# Patient Record
Sex: FEMALE | Race: WHITE | URBAN - METROPOLITAN AREA
[De-identification: names, ages, dates, MRNs, and addresses within clinical notes are randomized per-mention and may not be internally consistent; named-entity substitution may affect disease eponyms.]

---

## 2017-12-21 ENCOUNTER — OFFICE VISIT (OUTPATIENT)
Dept: FAMILY MEDICINE CLINIC | Age: 57
End: 2017-12-21

## 2017-12-21 VITALS
TEMPERATURE: 96.8 F | OXYGEN SATURATION: 96 % | WEIGHT: 153.38 LBS | HEIGHT: 66 IN | RESPIRATION RATE: 12 BRPM | DIASTOLIC BLOOD PRESSURE: 88 MMHG | SYSTOLIC BLOOD PRESSURE: 138 MMHG | BODY MASS INDEX: 24.65 KG/M2 | HEART RATE: 88 BPM

## 2017-12-21 DIAGNOSIS — Z12.31 ENCOUNTER FOR SCREENING MAMMOGRAM FOR BREAST CANCER: Primary | ICD-10-CM

## 2017-12-21 DIAGNOSIS — J01.01 ACUTE RECURRENT MAXILLARY SINUSITIS: Primary | ICD-10-CM

## 2017-12-21 PROCEDURE — 1036F TOBACCO NON-USER: CPT | Performed by: FAMILY MEDICINE

## 2017-12-21 PROCEDURE — G8427 DOCREV CUR MEDS BY ELIG CLIN: HCPCS | Performed by: FAMILY MEDICINE

## 2017-12-21 PROCEDURE — 99213 OFFICE O/P EST LOW 20 MIN: CPT | Performed by: FAMILY MEDICINE

## 2017-12-21 PROCEDURE — 3017F COLORECTAL CA SCREEN DOC REV: CPT | Performed by: FAMILY MEDICINE

## 2017-12-21 PROCEDURE — G8420 CALC BMI NORM PARAMETERS: HCPCS | Performed by: FAMILY MEDICINE

## 2017-12-21 PROCEDURE — G8484 FLU IMMUNIZE NO ADMIN: HCPCS | Performed by: FAMILY MEDICINE

## 2017-12-21 PROCEDURE — 3014F SCREEN MAMMO DOC REV: CPT | Performed by: FAMILY MEDICINE

## 2017-12-21 RX ORDER — CEFDINIR 300 MG/1
600 CAPSULE ORAL DAILY
Qty: 20 CAPSULE | Refills: 0 | Status: SHIPPED | OUTPATIENT
Start: 2017-12-21 | End: 2017-12-31

## 2017-12-21 NOTE — PROGRESS NOTES
Narrative    No narrative on file     Family History   Problem Relation Age of Onset    High Blood Pressure Mother     Diabetes Mother     Heart Disease Father      Past Medical History:   Diagnosis Date    Grave's disease     Hypertension     Pernicious anemia      History reviewed. No pertinent surgical history. REVIEW OF SYSTEMS:   REVIEW OF SYSTEMS:   Patient seen today for exam.  Denies any problems with hearing, headaches or vision. Denies any shortness of breath, chest pain, nausea or vomiting. No black stool, no blood in the stool. No heartburn. Denies any problems with constipation or diarrhea either. No dysuria type symptoms. Objective:     /88 (Site: Left Arm, Position: Sitting, Cuff Size: Medium Adult)   Pulse 88   Temp 96.8 °F (36 °C) (Temporal)   Resp 12   Ht 5' 6\" (1.676 m)   Wt 153 lb 6 oz (69.6 kg)   SpO2 96%   BMI 24.76 kg/m²     Physical Exam      PHYSICAL EXAMINATION:  Vital signs as noted. Alert, oriented in no acute distress. HEENT:  TMs are showing fluid bilaterally. Pharynx reveals postnasal drainage noted. Positive pain over sinuses and forehead  Pos shoddy adenopathy noted bilaterallyNECK: supple. HEART:  RRR without gallops. LUNGS:  clear to auscultation, no wheezing or rhonchi. ABDOMEN:  soft and nontender, bowel sounds positive. EXTREMITIES:  no edema. SKIN: without any changes      Assessment:      1. Acute recurrent maxillary sinusitis               Plan:        Orders Placed This Encounter   Medications    cefdinir (OMNICEF) 300 MG capsule     Sig: Take 2 capsules by mouth daily for 10 days     Dispense:  20 capsule     Refill:  0     No orders of the defined types were placed in this encounter.           Health Maintenance Due   Topic Date Due    Hepatitis C screen  1960    HIV screen  04/04/1973    DTaP/Tdap/Td vaccine (1 - Tdap) 04/04/1977    Cervical cancer screen  04/04/1979    Lipid screen  04/04/1998    Colon cancer screen colonoscopy  04/04/2008    Flu vaccine (1) 09/01/2017             Controlled Substances Monitoring:      she will see us over this next month for her physical exam fasting        If anything worsens or changes please call us at once , follow-up in the office as planned,

## 2024-01-24 ENCOUNTER — ANCILLARY PROCEDURE (OUTPATIENT)
Dept: RADIOLOGY | Facility: CLINIC | Age: 64
End: 2024-01-24
Payer: COMMERCIAL

## 2024-01-24 DIAGNOSIS — Z82.49 FAMILY HISTORY OF ISCHEMIC HEART DISEASE AND OTHER DISEASES OF THE CIRCULATORY SYSTEM: ICD-10-CM

## 2024-01-24 DIAGNOSIS — I11.9 HYPERTENSIVE HEART DISEASE WITHOUT HEART FAILURE: ICD-10-CM

## 2024-01-24 PROCEDURE — 75571 CT HRT W/O DYE W/CA TEST: CPT

## 2025-06-03 ENCOUNTER — APPOINTMENT (OUTPATIENT)
Dept: PRIMARY CARE | Facility: CLINIC | Age: 65
End: 2025-06-03
Payer: COMMERCIAL

## 2025-06-03 VITALS
WEIGHT: 165 LBS | BODY MASS INDEX: 27.49 KG/M2 | DIASTOLIC BLOOD PRESSURE: 92 MMHG | HEART RATE: 84 BPM | SYSTOLIC BLOOD PRESSURE: 171 MMHG | HEIGHT: 65 IN

## 2025-06-03 DIAGNOSIS — I87.303 CHRONIC VENOUS HYPERTENSION INVOLVING BOTH SIDES: Primary | ICD-10-CM

## 2025-06-03 DIAGNOSIS — I83.893 VARICOSE VEINS OF BILATERAL LOWER EXTREMITIES WITH OTHER COMPLICATIONS: ICD-10-CM

## 2025-06-03 PROCEDURE — 1123F ACP DISCUSS/DSCN MKR DOCD: CPT | Performed by: INTERNAL MEDICINE

## 2025-06-03 PROCEDURE — 1036F TOBACCO NON-USER: CPT | Performed by: INTERNAL MEDICINE

## 2025-06-03 PROCEDURE — 1160F RVW MEDS BY RX/DR IN RCRD: CPT | Performed by: INTERNAL MEDICINE

## 2025-06-03 PROCEDURE — 1158F ADVNC CARE PLAN TLK DOCD: CPT | Performed by: INTERNAL MEDICINE

## 2025-06-03 PROCEDURE — 1159F MED LIST DOCD IN RCRD: CPT | Performed by: INTERNAL MEDICINE

## 2025-06-03 PROCEDURE — 99204 OFFICE O/P NEW MOD 45 MIN: CPT | Performed by: INTERNAL MEDICINE

## 2025-06-03 RX ORDER — LEVOTHYROXINE SODIUM 75 UG/1
75 TABLET ORAL DAILY
COMMUNITY
Start: 2025-03-19 | End: 2025-06-17

## 2025-06-03 RX ORDER — PRAVASTATIN SODIUM 20 MG/1
20 TABLET ORAL DAILY
COMMUNITY

## 2025-06-03 RX ORDER — OMEPRAZOLE 40 MG/1
1 CAPSULE, DELAYED RELEASE ORAL DAILY
COMMUNITY

## 2025-06-03 RX ORDER — MELATONIN 10 MG
1 TABLET, SUBLINGUAL SUBLINGUAL DAILY
COMMUNITY

## 2025-06-03 RX ORDER — CARVEDILOL 6.25 MG/1
6.25 TABLET ORAL 2 TIMES DAILY
COMMUNITY

## 2025-06-03 RX ORDER — CYANOCOBALAMIN 1000 UG/ML
1000 INJECTION, SOLUTION INTRAMUSCULAR; SUBCUTANEOUS
COMMUNITY

## 2025-06-03 RX ORDER — HYDRALAZINE HYDROCHLORIDE 10 MG/1
10 TABLET, FILM COATED ORAL DAILY
COMMUNITY

## 2025-06-03 RX ORDER — ASPIRIN 81 MG/1
81 TABLET ORAL
COMMUNITY

## 2025-06-03 RX ORDER — AMLODIPINE BESYLATE 5 MG/1
5 TABLET ORAL DAILY
COMMUNITY

## 2025-06-03 NOTE — PROGRESS NOTES
Chief Complaints:  New consult for leg symptoms referred by self      HPI:    History of Present Illness  Liz Anand is a 65 year old female who presents with leg pain and swelling.    She experiences tightness and aching in her legs, particularly at the end of the day or after prolonged standing. Her legs swell slightly, but the primary issue is the aching sensation. The symptoms persist even when wearing compression stockings, which she uses more frequently in the winter due to discomfort and heat in the summer. She describes the sensation of the stockings as feeling like a 'tourniquet' which exacerbates knee pain. Despite wearing compression stockings, her legs remain swollen by the end of the day. She is unsure of the compression level of her current stockings.    She maintains an active lifestyle, caring for her grandchildren and engaging in activities such as walking on a treadmill for 20 minutes daily and using an exercise bike for 10 minutes. She also participates in camping and hiking with her family. 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    She denies any history of smoking and monitors her blood pressure at home, noting that it is typically well-controlled outside of medical settings. She has a family history of venous issues, with her  having experienced deep vein thrombosis and other family members having similar vein problems.         Bilateral Leg Symptoms:  Current symptoms include Leg pain, swelling, cramps and intermittent achiness  The severity is mild to moderate moderate.  Aggravating factors include prolonged sitting/standing, walking.  Alleviating factors include leg elevation.  Activities of Daily Living The patient's symptoms are worse later in the day,   The patient reports that the symptoms interfere with day to day activities.    Pertinent medical history:  no deep venous thrombosis,  "no hypercoagulable state, no pulmonary embolism and no thrombophlebitis. Risk factors:  family history of varicose veins.     ROS:  Respiratory:  no shortness of breath.  no cough, sinus congestion    Cardiovascular:    no Chest pain.  no Claudication.  no Cyanosis.  Palpitations, denies.    Neurologic:    no Tingling/Numbness.  no Loss of strength.    Social History:  no Tobacco Use:    Medications Ordered Prior to Encounter[1]     RX Allergies[2]     Examination:    Visit Vitals  BP (!) 171/92 (BP Location: Left arm, Patient Position: Sitting, BP Cuff Size: Adult)   Pulse 84   Ht 1.651 m (5' 5\")   Wt 74.8 kg (165 lb)   BMI 27.46 kg/m²   Smoking Status Never   BSA 1.85 m²        Normal-built, well-nourished  with no apparent distress. Alert oriented  Skin:  Normal turgor.  No rash.  Head:  Normocephalic, atraumatic.  Eyes:  Pupils are equal, round,.  No pallor of conjunctivae.  Mucous membranes are moist  Neck:  Supple.  No JVD.  No carotid bruit.  No thyromegaly. No cervical lymphadenopathy.  No clubbing, peripheral osteoarthritis noted.  Right small finger patient has a nodule in the distal phalangeal joint.  Nontender.  No redness or warmness.  Chest:  Vesicular breathing. Bilaterally good air entry.  No wheezing.  No crackles.  Heart:  Regular rate and rhythm.  S1, S2 positive.  No murmur.  Abdomen:  Soft and nontender.  Bowel sounds are positive.  No organomegaly.  Extremities: Detailed leg exam below.  Bilaterally 2+ dorsalis pedis pulses.  No calf tenderness. Homans sign is negative.  Neuro Exam: No focal signs. Gait is normal.    Venous Exam:  Varicose veins in left calf present  Varicose veins in left thigh absent  Varicose veins in right calf present  Varicose veins in right thigh absent  Reticular veins in left calf present  Reticular veins in left thigh present  Reticular veins in right calf present  Reticular veins in right thigh absent  Telangiectasias in left calf present  Telangiectasias in left " thigh present  Telangiectasias in right calf absent  Telangiectasias in right thigh absent  Right leg trace edema present  Left leg trace edema present  Hyperpigmentation in left leg absent  Hyperpigmentation in right leg absent  Lipodermatosclerosis in right leg absent  Lipodermatosclerosis in left leg absent  Dermatitis in left leg absent  Dermatitis in right leg absent  Corona phlebectatica in left leg absent).  Corona phlebectatica in right leg absent  Atrophe [place] in left leg absent  Atrophe [place] in right leg absent  Ulcer(s) in left leg absent  Ulcer(s) in right leg absent    CEAP Classification  Right leg CEAP C 3S Ep  Left leg CEAP C 3S Ep      Assessment & Plan  Chronic Venous Insufficiency  Leg tightness, swelling, and aching suggest chronic venous insufficiency, exacerbated by standing. Compression stockings provide partial relief. Family history indicates genetic predisposition. Discussed pathophysiology and treatment options, including sclerotherapy and laser treatment. Emphasized post-procedure compression stocking use.  - Prescribe 30-40 mmHg thigh-high compression stockings.  - Schedule venous ultrasound in three months.  - Plan for sclerotherapy or laser treatment based on ultrasound.  - Educate on compression stockings and active lifestyle.    Hypertension  Blood pressure elevated at 170/90 mmHg, though normal at home. Emphasized importance of control to prevent complications.  - Continue home blood pressure monitoring and was advised to follow-up with the PCP.  - Emphasize blood pressure control.    Osteoarthritis  Arthritis nodules on right small finger likely due to osteoarthritis, associated with repetitive hand movements.  - Consider specialist referral if symptoms worsen.         Assessment/Plan :  Problem List Items Addressed This Visit       Varicose veins of bilateral lower extremities with other complications    Relevant Orders    Compression Stockings 30-40 mmHg    Vascular US  Lower Extremity Vein Mapping Bilateral    Chronic venous hypertension involving both sides - Primary    Relevant Orders    Compression Stockings 30-40 mmHg    Vascular US Lower Extremity Vein Mapping Bilateral       Orders Placed This Encounter   Procedures    Compression Stockings 30-40 mmHg     Bilateral Thigh high       Plan     1. Varicose veins of bilateral lower extremities with other complications  Start graduated thigh high compression stockings 30 - 40 mm Hg during the wakeup/ day time. Rx GCS today  -Begin 6 weeks trial of conservative therapy with GCS. Trial begins today  -Schedule for B/L DUS mapping and treatment planning as discussed. Patient Educated with: Varicose  Veins and hand out was given to the patient.    Discussions    1. Varicose veins of leg with pain  Patient has symptoms of chronic venous insufficiency which collaborates with patient's examination as noted above. During this visit, we had a detailed discussion about the pathophysiology of the venous disease and the conservative managements.  We discussed the genetic factors involved in the chronic venous insufficiency and also emphasize the environmental factors such as obesity, lifestyle which could contributes to the worsening of the chronic venous insufficiency. The patient will continue to have the regular activities including walking. The patient also will do all the other conservative management including graduated compression stockings 30 to 40 mmHg thigh-high during the daytime as we prescribed.   We also discussed in details about the importance of complete treatment of the pathophysiology starting from the venous reflux if it is progressing from the deep junction by endovenous ablation of large truncal superficial veins and then treating the large refluxing tributaries with ultrasound-guided foam sclerotherapy to improve the symptoms and venous return. Handouts were given and patient verbalizes the understanding of our  "discussions.  The patient will follow up with us in about five week´s time for a detailed ultrasound evaluation which will give us the information about the abnormally refluxing superficial veins. After the study the abnormal venous system will be illustrated in the map form.  The patient will return in about  six weeks for re- evaluation (after insurance required conservative treatment trial) and discussions of the ultrasound evaluation results to decide about any further interventions, which are appropriate to relieve symptoms and prevent future complications from chronic vein disease.    Patient Education  RECOMMENDATIONS FOR BETTER LEG CARE  REGULAR EXERCISE  Walking, running, aerobics, swimming, elliptical machine, or biking for 30 minutes, 5 days per week will help reduce aching, pain and tiredness of your legs.  ELEVATE YOUR LEGS  Elevating your legs, heels slightly above chest level for at least 10 minutes, once or twice daily may diminish aching and swelling.  MOVE YOUR LEGS FREQUENTLY  Flexing your ankles will pump blood out of your legs like walking does.  Repeat this every 10 minutes while standing or sitting and try to walk for at least 2 minutes every 1/2 hour.  AVOID WEARING HEELS  Wearing high heels interferes with the normal pumping action that occurs when you walk and may lead to aching and cramping of the legs.  MAINTAIN PROPER WEIGHT  Even moderate weight loss may reduce aching in the legs due to varicose veins and diminish the rate at which spider veins develop.  WEAR SUPPORT HOSE  Available at pharmacies and medical supply stores.  There are many brands to choose from.  Lighter support hose are available at department stores.  However, it is best to wear stockings that are \"graduated\".  This will most efficiently improve your vein function.  Moderate Strength: 20-30 mm Hg   Heavy Strength: 30-40 mm Hg (prescription required)  Very Heavy Strength: 40-50 mm Hg (prescription required)  SYMPTOM " CONTROL WITH MEDICATIONS  Non-steroidal analgesics (NSAIDS) have been useful in controlling symptoms and reducing inflammation.  If you do not have a contraindication, allergy or intolerance to these medications, small doses may be used to alleviate symptoms.  Advice your practitioner if you have experienced prior adverse effects to these medications as alternative analgesics may be used.  For additional information - go to  [email or web address] and open the patient information tab    Counseling.  The patient was counseled regarding instructions for management, risk factor reductions, prognosis, patient and family education, impressions, risks and benefits of treatment options and importance of compliance with treatment. total time of encounter was 61 minutes and 49 minutes was spent counseling.     This medical note was created with the assistance of artificial intelligence (AI) for documentation purposes. The content has been reviewed and confirmed by the healthcare provider for accuracy and completeness. Patient consented to the use of audio recording and use of AI during their visit.             [1]   Current Outpatient Medications on File Prior to Visit   Medication Sig Dispense Refill    levothyroxine (Synthroid, Levoxyl) 75 mcg tablet Take 1 tablet (75 mcg) by mouth once daily. On an empty stomach      amLODIPine (Norvasc) 5 mg tablet Take 1 tablet (5 mg) by mouth once daily.      aspirin 81 mg EC tablet Take 1 tablet (81 mg) by mouth once daily.      calcium carbonate-vitamin D3 500 mg-15 mcg (600 unit) tablet 1 tablet once daily.      carvedilol (Coreg) 6.25 mg tablet Take 1 tablet (6.25 mg) by mouth 2 times a day. with food      cyanocobalamin (Vitamin B-12) 1,000 mcg/mL injection Inject 1 mL (1,000 mcg) into the muscle every 12 weeks.      hydrALAZINE (Apresoline) 10 mg tablet Take 1 tablet (10 mg) by mouth once daily.      omeprazole (PriLOSEC) 40 mg DR capsule Take 1 capsule (40 mg) by mouth once daily.       pravastatin (Pravachol) 20 mg tablet Take 1 tablet (20 mg) by mouth once daily.       No current facility-administered medications on file prior to visit.   [2]   Allergies  Allergen Reactions    Amoxicillin Hives

## 2025-11-03 ENCOUNTER — APPOINTMENT (OUTPATIENT)
Dept: PRIMARY CARE | Facility: CLINIC | Age: 65
End: 2025-11-03
Payer: COMMERCIAL